# Patient Record
Sex: MALE | Race: BLACK OR AFRICAN AMERICAN
[De-identification: names, ages, dates, MRNs, and addresses within clinical notes are randomized per-mention and may not be internally consistent; named-entity substitution may affect disease eponyms.]

---

## 2019-02-05 ENCOUNTER — HOSPITAL ENCOUNTER (EMERGENCY)
Dept: HOSPITAL 62 - ER | Age: 39
LOS: 1 days | Discharge: HOME | End: 2019-02-06
Payer: SELF-PAY

## 2019-02-05 DIAGNOSIS — Z91.038: ICD-10-CM

## 2019-02-05 DIAGNOSIS — R22.0: Primary | ICD-10-CM

## 2019-02-05 DIAGNOSIS — R68.84: ICD-10-CM

## 2019-02-05 DIAGNOSIS — F12.10: ICD-10-CM

## 2019-02-05 DIAGNOSIS — F17.200: ICD-10-CM

## 2019-02-05 LAB
ADD MANUAL DIFF: NO
ALBUMIN SERPL-MCNC: 5.1 G/DL (ref 3.5–5)
ALP SERPL-CCNC: 84 U/L (ref 38–126)
ALT SERPL-CCNC: 25 U/L (ref 21–72)
ANION GAP SERPL CALC-SCNC: 19 MMOL/L (ref 5–19)
AST SERPL-CCNC: 19 U/L (ref 17–59)
BASOPHILS # BLD AUTO: 0.1 10^3/UL (ref 0–0.2)
BASOPHILS NFR BLD AUTO: 0.5 % (ref 0–2)
BILIRUB DIRECT SERPL-MCNC: 0.5 MG/DL (ref 0–0.4)
BILIRUB SERPL-MCNC: 1 MG/DL (ref 0.2–1.3)
BUN SERPL-MCNC: 11 MG/DL (ref 7–20)
CALCIUM: 10.7 MG/DL (ref 8.4–10.2)
CHLORIDE SERPL-SCNC: 101 MMOL/L (ref 98–107)
CO2 SERPL-SCNC: 22 MMOL/L (ref 22–30)
EOSINOPHIL # BLD AUTO: 0 10^3/UL (ref 0–0.6)
EOSINOPHIL NFR BLD AUTO: 0 % (ref 0–6)
ERYTHROCYTE [DISTWIDTH] IN BLOOD BY AUTOMATED COUNT: 12.8 % (ref 11.5–14)
GLUCOSE SERPL-MCNC: 85 MG/DL (ref 75–110)
HCT VFR BLD CALC: 43.7 % (ref 37.9–51)
HGB BLD-MCNC: 15.3 G/DL (ref 13.5–17)
LYMPHOCYTES # BLD AUTO: 1 10^3/UL (ref 0.5–4.7)
LYMPHOCYTES NFR BLD AUTO: 6.9 % (ref 13–45)
MCH RBC QN AUTO: 32.2 PG (ref 27–33.4)
MCHC RBC AUTO-ENTMCNC: 34.9 G/DL (ref 32–36)
MCV RBC AUTO: 92 FL (ref 80–97)
MONOCYTES # BLD AUTO: 1.2 10^3/UL (ref 0.1–1.4)
MONOCYTES NFR BLD AUTO: 8.2 % (ref 3–13)
NEUTROPHILS # BLD AUTO: 12.2 10^3/UL (ref 1.7–8.2)
NEUTS SEG NFR BLD AUTO: 84.4 % (ref 42–78)
PLATELET # BLD: 312 10^3/UL (ref 150–450)
POTASSIUM SERPL-SCNC: 4.2 MMOL/L (ref 3.6–5)
PROT SERPL-MCNC: 9.3 G/DL (ref 6.3–8.2)
RBC # BLD AUTO: 4.74 10^6/UL (ref 4.35–5.55)
SODIUM SERPL-SCNC: 141.9 MMOL/L (ref 137–145)
TOTAL CELLS COUNTED % (AUTO): 100 %
WBC # BLD AUTO: 14.4 10^3/UL (ref 4–10.5)

## 2019-02-05 PROCEDURE — 85025 COMPLETE CBC W/AUTO DIFF WBC: CPT

## 2019-02-05 PROCEDURE — 99283 EMERGENCY DEPT VISIT LOW MDM: CPT

## 2019-02-05 PROCEDURE — 80053 COMPREHEN METABOLIC PANEL: CPT

## 2019-02-05 PROCEDURE — 96374 THER/PROPH/DIAG INJ IV PUSH: CPT

## 2019-02-05 PROCEDURE — 87040 BLOOD CULTURE FOR BACTERIA: CPT

## 2019-02-05 PROCEDURE — 36415 COLL VENOUS BLD VENIPUNCTURE: CPT

## 2019-02-05 PROCEDURE — 70490 CT SOFT TISSUE NECK W/O DYE: CPT

## 2019-02-05 PROCEDURE — 96375 TX/PRO/DX INJ NEW DRUG ADDON: CPT

## 2019-02-05 PROCEDURE — 87077 CULTURE AEROBIC IDENTIFY: CPT

## 2019-02-05 NOTE — ER DOCUMENT REPORT
ED Medical Screen (RME)





- General


Chief Complaint: Facial Swelling


Stated Complaint: POSSIBLE ABSCESS


Time Seen by Provider: 02/05/19 23:06


Notes: 





Patient is a 38-year-old male who presents emergency department with a chief 

complaint of right neck swelling.  5 days ago he had a toothache and he noticed 

that he started to have some swelling that has progressively gotten worse.  He 

does have significant swelling on the right side of his neck near his jaw.  He 

rates his pain 10/10.  He states that he is having a hard time swallowing.


TRAVEL OUTSIDE OF THE [place] IN LAST 30 DAYS: No





- Related Data


Allergies/Adverse Reactions: 


                                        





No Known Drug Allergies Allergy (Verified 02/05/19 23:09)


   


bug bites Allergy (Uncoded 02/05/19 23:11)


   











Past Medical History





- Social History


Frequency of alcohol use: Occasional


Drug Abuse: Marijuana


Renal/ Medical History: Denies: Hx Peritoneal Dialysis





Physical Exam





- Vital signs


Vitals: 


                                        











Temp Pulse Resp BP Pulse Ox


 


 99.2 F   107 H  20   129/99 H  98 


 


 02/05/19 20:16  02/05/19 20:16  02/05/19 20:16  02/05/19 20:16  02/05/19 20:16














- HEENT


Head: Normocephalic


Neck: Other - Swelling on the right side of his neck and jaw.





Course





- Vital Signs


Vital signs: 


                                        











Temp Pulse Resp BP Pulse Ox


 


 99.2 F   107 H  20   129/99 H  98 


 


 02/05/19 20:16  02/05/19 20:16  02/05/19 20:16  02/05/19 20:16  02/05/19 20:16

## 2019-02-06 VITALS — DIASTOLIC BLOOD PRESSURE: 88 MMHG | SYSTOLIC BLOOD PRESSURE: 128 MMHG

## 2019-02-06 NOTE — RADIOLOGY REPORT (SQ)
EXAM DESCRIPTION: 



CT NECK CHEST WITHOUT IV CONTRAST



COMPLETED DATE/TME:  02/05/2019 23:12



CLINICAL HISTORY:  38 years  Male  eval abscess to R jaw



COMPARISON:  None.



TECHNIQUE:  Contiguous axial images obtained through the neck

without  IV contrast. Reformatted images obtained.  



This exam was performed according to our department optimization

program which includes automated exposure control, adjustment of

the mA and/or kv according to patient size and/or use of

iterative reconstruction technique.



FINDINGS:



Study is limited without use of intravenous contrast.

The parotid glands and submandibular glands appear within normal

limits.

Epiglottis and vocal cords are unremarkable. Unenhanced thyroid

appears within normal limits.



There is extensive dental disease. There is apical lucency

surrounding the right mandibular central incisor as well as decay

involving molars bilaterally with a small amount of surrounding

lucency particularly on the left.

Apical lucencies are also present along the maxillary bicuspid

and premolars on the left and lateral incisor and bicuspid on the

right.

There is asymmetric soft tissue and low-attenuation in the floor

of the mouth on the right which May reflect phlegmon or

developing abscess.





No fluid or significant mucosal thickening in the visualized

paranasal sinuses.   



Reactive adenopathy.



IMPRESSION:



Extensive dental disease which may be the cause of the patient's

soft tissue swelling and inflammatory changes



Submental and submandibular adenopathy as well as jugular

digastric adenopathy this is worse on the right with there is

asymmetric soft tissue in the floor the mouth and submandibular

region concerning for cellulitis and/or phlegmon or developing

abscess. Study is significantly limited without use of

intravenous contrast.

## 2019-02-06 NOTE — ER DOCUMENT REPORT
ED Head/Face/Scalp Injury





- General


Chief Complaint: Facial Swelling


Stated Complaint: POSSIBLE ABSCESS


Time Seen by Provider: 02/06/19 01:40


Mode of Arrival: Ambulatory


Information source: Patient


Notes: 





HISTORY OF PRESENT ILLNESS:





Patient is a 38-year-old male with no significant past medical history who 

presents with pain and swelling to the right side of the jaw.





Location: Right lower jaw


Onset: Sudden


Provocation: Chewing, opening and closing the mouth


Quality: Throbbing and pain


Radiation: None


Severity: Moderate to severe


Timing: Constant


Injury: None











REVIEW OF SYSTEMS:





CONSTITUTIONAL : Denies fever or chills, no sweats.  Denies recent illness.


EENT:   Positive for jaw/mouth pain and swelling, mild facial swelling.  Denies 

eye, ear, or throat symptoms.  Denies nasal or sinus congestion.


CARDIOVASCULAR: Denies chest pain.


RESPIRATORY: Denies cough, cold, or chest congestion.  Denies shortness of 

breath, difficulty breathing, or wheezing.


GASTROINTESTINAL: Denies abdominal pain.  Denies nausea, vomiting, or diarrhea. 

Denies constipation. 


GENITOURINARY: Denies difficulty urinating, painful urination, burning, 

frequency, or blood in urine.


MUSCULOSKELETAL:  Denies neck or back pain or joint pain or swelling.


SKIN:   Denies rash or skin lesions.


HEMATOLOGIC :   Denies easy bruising or bleeding.


LYMPHATIC:  Denies swollen, enlarged glands.


NEUROLOGICAL:  Denies altered mental status or loss of consciousness.  Denies 

headache.  Denies weakness or paralysis or loss of use of either side.  Denies 

problems with gait or speech.  Denies sensory or motor loss.


PSYCHIATRIC:  Denies anxiety or stress or depression.





All other systems reviewed and negative.











PHYSICAL EXAMINATION:





GENERAL: Well-appearing, well-nourished and in no acute distress.


HEAD: Atraumatic, normocephalic. No scalp deformity, depression, or crepitance.


EYES: Pupils are 3 mm and equal/round/reactive to light, extraocular movements 

intact, sclera anicteric, conjunctiva are normal.


ENT: Moderate edema to the right side of the lower jaw, no periorbital edema.  

Diffusely poor dentition, no swelling or bleeding from the gingiva, no drainage.

 Nares patent bilaterally, oropharynx clear without exudates or palatal 

petechia.  Moist mucous membranes. No tonsil hypertrophy.


NECK: Normal range of motion, supple without lymphadenopathy.


LUNGS: Breath sounds present, equal, and clear to auscultation bilaterally.  No 

wheezes, rales, or rhonchi.


HEART: Regular rate and rhythm without murmurs, rubs, or gallops. 2+ peripheral 

pulses.  Normal capillary refill.


ABDOMEN: Soft, nontender, nondistended. Normoactive bowel sounds.  No guarding, 

no rebound.  No masses appreciated.


BACK: Normal contour, no midline tenderness. Rectal exam deferred.


GENITAL: Deferred.


EXTREMITIES: Normal range of motion, no pitting or edema.  No cyanosis.


NEUROLOGICAL: No focal neurological deficits. Moves all extremities 

spontaneously and on command.


PSYCH: Normal mood, normal affect.  No suicidal thoughts/ideations.  No 

homocidal thoughts/ideations.  No hallucinations.


SKIN: Warm, dry, normal turgor, no rashes or lesions noted.











ASSESSMENT AND PLAN:





This patient is a 38-year-old male who presents with likely periapical abscess 

versus phlegmon of the right side of the face.  CT scan is negative for acute 

abscess formation.





1. Will obtain blood work and give IV Unasyn along with morphine for pain cont

rol.


2. Will reassess and likely discharge.


TRAVEL OUTSIDE OF THE [place] IN LAST 30 DAYS: No





- Related Data


Allergies/Adverse Reactions: 


                                        





No Known Drug Allergies Allergy (Verified 02/05/19 23:09)


   


bug bites Allergy (Uncoded 02/05/19 23:11)


   











Past Medical History





- General


Information source: Patient





- Social History


Smoking Status: Current Every Day Smoker


Chew tobacco use (# tins/day): No


Frequency of alcohol use: Occasional


Drug Abuse: Marijuana


Lives with: Family


Family History: Reviewed & Not Pertinent


Patient has suicidal ideation: No


Patient has homicidal ideation: No





- Medical History


Medical History: Negative





- Past Medical History


Cardiac Medical History: Reports: None


Pulmonary Medical History: Reports: None


EENT Medical History: Reports: None


Neurological Medical History: Reports: None


Endocrine Medical History: Reports: None


Renal/ Medical History: Reports: None.  Denies: Hx Peritoneal Dialysis


Malignancy Medical History: Reports None


GI Medical History: Reports: None


Musculoskeletal Medical History: Reports None


Skin Medical History: Reports None


Psychiatric Medical History: Reports: None


Traumatic Medical History: Reports: None


Infectious Medical History: Reports: None


Surgical Hx: Negative


Past Surgical History: Reports: None





- Immunizations


Immunizations up to date: Yes


Hx Diphtheria, Pertussis, Tetanus Vaccination: Yes


History of Influenza Vaccine for 10/2017 - 3/2018 Season: Unknown





Physical Exam





- Vital signs


Vitals: 


                                        











Temp Pulse Resp BP Pulse Ox


 


 99.2 F   107 H  20   129/99 H  98 


 


 02/05/19 20:16  02/05/19 20:16  02/05/19 20:16  02/05/19 20:16  02/05/19 20:16














Course





- Vital Signs


Vital signs: 


                                        











Temp Pulse Resp BP Pulse Ox


 


 98.9 F   60   20   128/88 H  100 


 


 02/06/19 00:53  02/06/19 00:53  02/06/19 00:53  02/06/19 00:53  02/06/19 00:53














- Laboratory


Result Diagrams: 


                                 02/05/19 23:23





                                 02/05/19 23:23


Laboratory results interpreted by me: 


                                        











  02/05/19 02/05/19





  23:23 23:23


 


WBC  14.4 H 


 


Seg Neutrophils %  84.4 H 


 


Lymphocytes %  6.9 L 


 


Absolute Neutrophils  12.2 H 


 


Creatinine   2.17 H


 


Est GFR ( Amer)   42 L


 


Est GFR (Non-Af Amer)   34 L


 


Calcium   10.7 H


 


Direct Bilirubin   0.5 H


 


Total Protein   9.3 H


 


Albumin   5.1 H














Discharge





- Discharge


Clinical Impression: 


 Facial swelling





Condition: Good


Disposition: HOME, SELF-CARE


Instructions:  Toothache ([facility])


Additional Instructions: 


You have been evaluated in the Emergency Department for pain and swelling to 

your face.  A CT scan did not show evidence of an acute abscess.  While here, 

you was given IV antibiotics as well as pain medications and are now safe to be 

discharged home.  Please follow-up with a dentist or an oral surgeon as soon as 

you can.  You were provided with prescriptions for both an antibiotic and pain 

medication, take these as instructed. Return to the Emergency Department if you 

experience worsening swelling/pain, high fevers, the inability to open the 

mouth, chest pain, or any other concerning symptoms.


Print Language: English